# Patient Record
Sex: FEMALE | Race: BLACK OR AFRICAN AMERICAN | NOT HISPANIC OR LATINO | Employment: FULL TIME | URBAN - METROPOLITAN AREA
[De-identification: names, ages, dates, MRNs, and addresses within clinical notes are randomized per-mention and may not be internally consistent; named-entity substitution may affect disease eponyms.]

---

## 2017-11-30 DIAGNOSIS — Z36.89 ENCOUNTER FOR FETAL ANATOMIC SURVEY: Primary | ICD-10-CM

## 2018-01-04 ENCOUNTER — OFFICE VISIT (OUTPATIENT)
Dept: MATERNAL FETAL MEDICINE | Facility: CLINIC | Age: 23
End: 2018-01-04
Payer: OTHER GOVERNMENT

## 2018-01-04 VITALS
WEIGHT: 167 LBS | SYSTOLIC BLOOD PRESSURE: 115 MMHG | DIASTOLIC BLOOD PRESSURE: 65 MMHG | BODY MASS INDEX: 28.51 KG/M2 | HEIGHT: 64 IN

## 2018-01-04 DIAGNOSIS — Z03.73 FETAL ANOMALY SUSPECTED BUT NOT FOUND: ICD-10-CM

## 2018-01-04 DIAGNOSIS — Z36.89 ENCOUNTER FOR FETAL ANATOMIC SURVEY: ICD-10-CM

## 2018-01-04 PROCEDURE — 76811 OB US DETAILED SNGL FETUS: CPT | Mod: S$GLB,,, | Performed by: OBSTETRICS & GYNECOLOGY

## 2018-01-04 PROCEDURE — 99499 UNLISTED E&M SERVICE: CPT | Mod: S$GLB,,, | Performed by: OBSTETRICS & GYNECOLOGY

## 2018-01-04 NOTE — PROGRESS NOTES
"Indication  ========    New Consult: Anatomy/Renal pyelectasis.    History  ======    General History  Height 163 cm  Height (ft) 5 ft  Height (in) 4 in  Other: OB: Dr. Ashwini Farmer  Previous Outcomes  Preg. no. 1  Outcome: Live YOB: 2015  Gest. age 41 w + 0 d  Weight 3,204 g  Gender: female  Details: Induction   2  Para 1  Wallace children born living (T) 1  Wallace children born (T) 1  Wallace living children (L) 1    Pregnancy History  ==============    Maternal Lab Tests  Test: Cell Free DNA Testing  Result: Informa Seq- Negative  Wants to know gender: yes    Maternal Assessment  =================    Weight 76 kg  Weight (lb) 167 lb  Height 163 cm  Height (ft) 5 ft  Height (in) 4 in  BP syst 115 mmHg  BP diast 65 mmHg  BMI 28.67 kg/m²    Method  ======    Transabdominal ultrasound examination, 2D Color Doppler, Andreia Epiq 7. View: Sufficient.    Pregnancy  =========    Wallace pregnancy. Number of fetuses: 1.    Dating  ======    GA by "stated dating" 27 w + 4 d  JOAQUIN by "stated dating": 2018  Ultrasound examination on: 2018  GA by U/S based upon: AC, BPD, Femur, HC  GA by U/S 28 w + 2 d  JOAQUIN by U/S: 3/27/2018  Assigned: Dating performed on 2018, based on the external assessment  Assigned GA 27 w + 4 d  Assigned JOAQUIN: 2018    General Evaluation  ==============    Cardiac activity: present.  bpm.  Fetal movements: visualized.  Presentation: cephalic.  Placenta:  Placental site: anterior. Distance from internal cervical os 76 mm.  Umbilical cord: Cord vessels: 3 vessel cord. Cord insertion: placental insertion: normal.  Amniotic fluid: Amount of AF: normal amount. MVP 7.4 cm.    Biophysical Profile  ==============    2: Fetal breathing movements  2: Gross body movements  2: Fetal tone  2: Amniotic fluid volume  : Biophysical profile score  Incidental BPP    Fetal Biometry  ============    Fetal Biometry  BPD 69.3 mm 27w 6d Hadlock  OFD 92.3 mm 29w 5d " Akiko  .7 mm 28w 2d Hadlock  .9 mm 29w 2d Hadlock  Femur 51.3 mm 27w 3d Hadlock  Cerebellum tr 32.2 mm 29w 0d Shepherd  CM 6.0 mm  Humerus 45.2 mm 26w 6d Akiko  EFW 1,236 g 58% Akhil  Calculated by: Hadlock (BPD-HC-AC-FL)  EFW (lb) 2 lb  EFW (oz) 12 oz  Cephalic index 0.75  HC / AC 1.04  FL / BPD 0.74  FL / AC 0.20  MVP 7.4 cm   bpm    Fetal Anatomy  ===========    Cranium: normal  Lateral ventricles: normal  Choroid plexus: normal  Midline falx: normal  Cavum septi pellucidi: normal  Cerebellum: normal  Cisterna magna: normal  Head shape: normal  Posterior fossa: normal  Neck: normal  Lips: normal  Profile: normal  Nose: normal  RVOT: normal  LVOT: normal  4-chamber view: 4-chamber normal, septum normal  Situs: normal  Aortic arch: normal  Ductal arch: normal  SVC: normal  IVC: normal  3-vessel view: normal  3-vessel-trachea view: normal  Cardiac axis: normal  Rt lung: normal  Lt lung: normal  Diaphragm: normal  Cord insertion: normal  Stomach: normal  Kidneys: normal  Bladder: normal  Genitals: normal  Abdom. wall: normal  Bowel: normal  Rt renal artery: normal  Lt renal artery: normal  Cervical spine: normal  Thoracic spine: normal  Lumbar spine: normal  Sacral spine: normal  Arms: both visualized  Legs: both visualized  Rt arm: normal  Lt arm: normal  Rt hand: visualized  Lt hand: visualized  Rt leg: normal  Lt leg: normal  Rt foot: normal  Lt foot: normal  Position of hands: normal  Position of feet: normal  Gender: male  Wants to know gender: yes  Other: A full anatomic survey has been previously performed.    Maternal Structures  ===============    Uterus / Cervix  Cervix: Visualized  Approach: Transabdominal    Consultation  ==========    Prenatal records reviewed.   @ 27+4 referred for renal pyelectasis.  Doing well.  Appendectomy during this pregnancy at 14 weeks.  Iron/PNV  No PMH  PSH: Appendectomy, wisdom teeth  OB Hx: 41 week , 7#1oz, IOL  Normal cffDNA this  pregnancy.  Findings of normal ultrasound reviewed with patient. Discussed mild pyelectasis is often physiologic finding in the mid-trimester. Patient  reassured.    Impression  =========    A detailed anatomic ultrasound was performed for the indication of renal pyelectasis.  Wallace, living IUP in vertex presentation.  Fetal biometry measures consistent with stated EDC.  No fetal structural malformations are identified.  The fetal kidneys and bladder appear normal.  The placenta is anterior and not previa.  The AFV is normal.    Recommendation  ==============    Recommend follow-up ultrasound as clinically indicated.  Please re-consult with any questions or concerns. Thank you for allowing us to participate in the care of your patient.